# Patient Record
Sex: FEMALE | Race: WHITE
[De-identification: names, ages, dates, MRNs, and addresses within clinical notes are randomized per-mention and may not be internally consistent; named-entity substitution may affect disease eponyms.]

---

## 2019-01-05 ENCOUNTER — HOSPITAL ENCOUNTER (EMERGENCY)
Dept: HOSPITAL 25 - UCCORT | Age: 64
Discharge: HOME | End: 2019-01-05
Payer: COMMERCIAL

## 2019-01-05 VITALS — DIASTOLIC BLOOD PRESSURE: 82 MMHG | SYSTOLIC BLOOD PRESSURE: 123 MMHG

## 2019-01-05 DIAGNOSIS — Z87.891: ICD-10-CM

## 2019-01-05 DIAGNOSIS — J04.0: Primary | ICD-10-CM

## 2019-01-05 DIAGNOSIS — J45.909: ICD-10-CM

## 2019-01-05 DIAGNOSIS — Z88.8: ICD-10-CM

## 2019-01-05 DIAGNOSIS — Z88.0: ICD-10-CM

## 2019-01-05 DIAGNOSIS — Z88.2: ICD-10-CM

## 2019-01-05 PROCEDURE — 99202 OFFICE O/P NEW SF 15 MIN: CPT

## 2019-01-05 PROCEDURE — G0463 HOSPITAL OUTPT CLINIC VISIT: HCPCS

## 2019-01-05 NOTE — XMS REPORT
Continuity of Care Document (CCD)

 Created on:2018



Patient:Rica Bowie

Sex:Female

:1955

External Reference #:[id number]





Demographics







 Address  [address] [place] [address]

 

 Home Phone  [phone]

 

 Mobile Phone  [phone]

 

 Email Address  [email or web address]

 

 Preferred Language  en

 

 Marital Status  Not  or 

 

 Zoroastrianism Affiliation  Unknown

 

 Race  White

 

 Ethnic Group  Not  or 









Author







 Name  JanakDaltonna









Support







 Name  Relationship  Address  Phone

 

 Reji Bowie    [address] [phone]



     [place] [id number]  









Care Team Providers







 Name  Role  Phone

 

 Glenn Phillips  Care Team Information   Unavailable

 

 Glenn Phillips  Primary Care Physician  Unavailable









Payers







 Type  Date  Identification Numbers  Payment Provider  Subscriber

 

   Effective:  Policy Number: [id number]  BC [employer]  Rica Bowie



   2017      









 PayID: [id number]  [address] [address]









 [place] [address]







Advance Directives







 Description

 

 No Information Available







Problems







 Date  Description  Provider  Status

 

 Onset: 2018  Uncomplicated moderate persistent  Javed Felton MD
  Active



   asthma    

 

 Onset: 2018  Allergic rhinitis  Javed Felton MD  Active

 

 Onset: 2018  Allergic rhinitis due to pollen  Javed Felton MD  
Active







Family History







 Date  Family Member(s)  Problem(s)  Comments

 

   General  No Current Problems  







Social History







 Type  Date  Description  Comments

 

 Birth Sex    Unknown  

 

 Home Environment    Has a window air conditioner  

 

 Home Environment    Unfinished Basement  

 

 Home Environment    The basement is dry  

 

 Home Environment    There are draperies in the  



     home  

 

 Home Environment    The floors are carpeted  

 

 Home Environment    The floors are tile  

 

 Home Environment    The floors are wood  

 

 Home Environment    Uses natural gas heating  

 

 Smoke-Free    Home is smoke-free  

 

 Smoke-Free    Work is smoke-free  

 

 Pets    None  

 

 Tobacco Use  Start: Unknown End: Unknown  Patient is a former smoker  36 years

 

 Smoking Status  Reviewed: 18  Patient is a former smoker  36 years







Allergies, Adverse Reactions, Alerts







 Date  Description  Reaction  Status  Severity  Comments

 

 2018  Sulfa Antibiotics    Active    

 

 2018  Augmentin    Active    







Medications







 Medication  Date  Status  Form  Strength  Qnty  SIG  Indications  Ordering



                 Provider

 

 Flonase    Active  Suspension  50mcg/Act  9.900  2 puffs  J30.1  
Christopher



 Allergy  /        ml  each    SALIMA Felton MD



 Relief            nostril bid    

 

 Montelukast    Active  Tablets  10mg  30tab  take one  J30.1  Christopher



 Sodium  /        s  tablet by    SALIMA Felton MD



             mouth daily    



             in the    



             evening    

 

 Xyzal Allergy    Active  Tablets  5mg  30tab  take 1  J30.1  Christopher



 24HR  /        s  tablet (5    SALIMA Felton MD



             mg) by oral    



             route once    



             daily as    



             needed    

 

 Breo Ellipta    Active  Aerosol  200-25mcg  1unit  inhale one  J30.1  
Christopher



   /2018      /Inh  s  puff once a    SALIMA Felton MD



             day    

 

 Proair HFA    Active  Aerosol  108(90Bas  8.500  2 puffs  J30.1  
oph      e)  gm  every 4 as    SALIMA Felton MD



         mcg/Act    needed    

 

 Raloxifene    Active  Tablets  60mg    1 tab PO at    Unknown



 HCL  /0000          hs    

 

 Vitamin D    Active  Capsules  [id number]    take one    Unknown



 (Ergocalcifer  /0000          capsule by    



 ol)            mouth once    



             weekly at    



             hs    

 

 Biotin    Active  Capsules  5000mcg    1 cap PO at    Unknown



   /0000          hs    

 

 Aspirin Adult    Active  Tablets DR  81mg    1 by mouth    Unknown



 Low Dose  /0000          every day    



             at hs    

 

 Multi Vitamin  0000  Active  Tablets      1 tab PO at    Unknown



   /0000          hs    

 

 Calcium  00  Active  Tablets  600-200mg    1 tab PO    Unknown



   /0000      -Unit    daily at hs    

 

                 

 

 Montelukast    Hx  Tablets  10mg    take one    Unknown



 Sodium  /0000          tablet by    



   -          mouth daily    



             at               

 

 Ventolin HFA    Hx  Aerosol  108(90Bas    inhale 2    Unknown



   /0000      e)    puffs by    



   -      mcg/Act    inhalation    



             route every    



   /2018          4 hours as    



             needed    







Immunizations







 Description

 

 No Information Available







Vital Signs







 Date  Vital  Result  Comment

 

 2018  1:17pm  BP Systolic  128 mmHg  









 BP Diastolic  76 mmHg  

 

 Height  64 inches  5'4"

 

 Weight  161.00 lb  

 

 BMI (Body Mass Index)  27.6 kg/m2  

 

 Heart Rate  97 /min  

 

 Respiratory Rate  18 /min  

 

 Body Temperature  97.3 F  









 2018  2:01pm  BP Systolic  112 mmHg  









 BP Diastolic  74 mmHg  

 

 Height  64 inches  5'4"

 

 Weight  161.00 lb  

 

 BMI (Body Mass Index)  27.6 kg/m2  

 

 Heart Rate  68 /min  

 

 Respiratory Rate  18 /min  

 

 Body Temperature  97.7 F  

 

 O2 % BldC Oximetry  96 %  









 2018  3:25pm  BP Systolic  100 mmHg  









 BP Diastolic  72 mmHg  

 

 Height  64 inches  5'4"

 

 Weight  161.00 lb  

 

 BMI (Body Mass Index)  27.6 kg/m2  

 

 Heart Rate  74 /min  

 

 Respiratory Rate  16 /min  

 

 O2 % BldC Oximetry  99 %  







Results







 Description

 

 No Information Available







Procedures







 Date  Code  Description  Status

 

 2018  69529  Nitric Oxide  Gas Determination  Completed

 

 2018  93279  Allergy Tests Percutaneous W/ Allergenic Extracts  Completed

 

 2018  94923  Bronchodilation Responsiveness Spirometry Pre/Post  
Completed



     Bronchodil Adm  







Encounters







 Type  Date  Location  Provider  Dx  Diagnosis

 

 Office Visit  2018  2:00p  ABBIE Doyle  J30.1  Allergic 
rhinitis due to



           pollen









 J30.89  Other allergic rhinitis

 

 J45.40  Moderate persistent asthma, uncomplicated









 Office Visit  2018  3:30p  JULEE Frias30.1  
Allergic rhinitis



       MD    due to pollen









 J30.89  Other allergic rhinitis

 

 J45.40  Moderate persistent asthma, uncomplicated







Plan of Treatment

2018 - ABBIE PerdueJ30.1 Allergic rhinitis due to xulcwqV01.89 Other 
allergic vykovzveE55.40 Moderate persistent asthma, uncomplicatedComments:
Patient's PFT is within normal range and exhaled nitric oxide is 26 ppb.  
Patient tested 2+ to all 59 skin prick allergens.  Patient to use Breo for 
prophylaxis of her lungs and Proair for breakthroughchest symptoms.  Patient to 
use Flonase for prophylaxis of her nose and Xyzal for breakthrough 
nasalsymptoms.  Patient to use Singulair as prescribed.Follow up:6 months, PFT 
and NIOX prior

## 2019-01-05 NOTE — XMS REPORT
Continuity of Care Document (CCD)

 Created on:2018



Patient:Rica Bowie

Sex:Female

:1955

External Reference #:[id number]





Demographics







 Address  [address] [place] [address]

 

 Home Phone  [phone]

 

 Mobile Phone  [phone]

 

 Email Address  [email or web address]

 

 Preferred Language  en

 

 Marital Status  Not  or 

 

 Jewish Affiliation  Unknown

 

 Race  White

 

 Ethnic Group  Not  or 









Author







 Name  Javed Felton MD

 

 Address  [address]



   Unavailable



   [place] [address]









Support







 Name  Relationship  Address  Phone

 

 Reji Bowie    [address] [phone]



     [place] [address]  









Care Team Providers







 Name  Role  Phone

 

 Glenn Phillips  Care Team Information   Unavailable

 

 Glenn Phillips  Primary Care Physician  Unavailable









Payers







 Type  Date  Identification Numbers  Payment Provider  Subscriber

 

   Effective:  Policy Number: [id number]  BC VANDANA Gay  Rica LADD Azeb



   2017      









 PayID: [id number]  [address] [address]









 [place] [address]







Advance Directives







 Description

 

 No Information Available







Problems







 Date  Description  Provider  Status

 

 Onset: 2018  Uncomplicated moderate persistent  Javed Felton MD
  Active



   asthma    

 

 Onset: 2018  Allergic rhinitis  Javed Felton MD  Active

 

 Onset: 2018  Allergic rhinitis due to pollen  Javed Felton MD  
Active







Family History







 Date  Family Member(s)  Problem(s)  Comments

 

   General  No Current Problems  







Social History







 Type  Date  Description  Comments

 

 Birth Sex    Unknown  

 

 Home Environment    Has a window air conditioner  

 

 Home Environment    Unfinished Basement  

 

 Home Environment    The basement is dry  

 

 Home Environment    There are draperies in the  



     home  

 

 Home Environment    The floors are carpeted  

 

 Home Environment    The floors are tile  

 

 Home Environment    The floors are wood  

 

 Home Environment    Uses natural gas heating  

 

 Smoke-Free    Home is smoke-free  

 

 Smoke-Free    Work is smoke-free  

 

 Pets    None  

 

 Tobacco Use  Start: Unknown End: Unknown  Patient is a former smoker  36 years

 

 Smoking Status  Reviewed: 18  Patient is a former smoker  36 years







Allergies, Adverse Reactions, Alerts







 Date  Description  Reaction  Status  Severity  Comments

 

 2018  Sulfa Antibiotics    Active    

 

 2018  Augmentin    Active    







Medications







 Medication  Date  Status  Form  Strength  Qnty  SIG  Indications  Ordering



                 Provider

 

 Flonase    Active  Suspension  50mcg/Act  9.900  2 puffs  J30.1  
Christopher



 Allergy  /        ml  each    SALIMA Felton MD



 Relief            nostril bid    

 

 Montelukast    Active  Tablets  10mg  30tab  take one  J30.1  Christopher



 Sodium  /        s  tablet by    SALIMA Felton MD



             mouth daily    



             in the    



             evening    

 

 Xyzal Allergy    Active  Tablets  5mg  30tab  take 1  J30.1  Christopher



 24HR  /        s  tablet (5    SALIMA Felton MD



             mg) by oral    



             route once    



             daily as    



             needed    

 

 Breo Ellipta    Active  Aerosol  200-25mcg  1unit  inhale one  J30.1  
Christopher



   /2018      /Inh  s  puff once a    SALIMA Felton MD



             day    

 

 Proair HFA    Active  Aerosol  108(90Bas  8.500  2 puffs  J30.1  [place]ace      e)  gm  every 4    Raudel, RPA-C



         mcg/Act    hours as    



             needed    

 

 Raloxifene    Active  Tablets  60mg    1 tab PO at    Unknown



 HCL  /0000          hs    

 

 Vitamin D  00  Active  Capsules  91765Agqq    take one    Unknown



 (Ergocalcifer  /0000          capsule by    



 ol)            mouth once    



             weekly at    



             hs    

 

 Biotin  00  Active  Capsules  5000mcg    1 cap PO at    Unknown



   /0000          hs    

 

 Aspirin Adult    Active  Tablets DR  81mg    1 by mouth    Unknown



 Low Dose  /0000          every day    



             at hs    

 

 Multi Vitamin  00/00  Active  Tablets      1 tab PO at    Unknown



   /0000          hs    

 

 Calcium  0000  Active  Tablets  600-200mg    1 tab PO    Unknown



   /0000      -Unit    daily at hs    

 

                 

 

 Montelukast    Hx  Tablets  10mg    take one    Unknown



 Sodium  /0000          tablet by    



   -          mouth daily    



             at               

 

 Ventolin HFA    Hx  Aerosol  108(90Bas    inhale 2    Unknown



   /0000      e)    puffs by    



   -      mcg/Act    inhalation    



             route every    



   /2018          4 hours as    



             needed    







Immunizations







 Description

 

 No Information Available







Vital Signs







 Date  Vital  Result  Comment

 

 2018  1:17pm  BP Systolic  128 mmHg  









 BP Diastolic  76 mmHg  

 

 Height  64 inches  5'4"

 

 Weight  161.00 lb  

 

 BMI (Body Mass Index)  27.6 kg/m2  

 

 Heart Rate  97 /min  

 

 Respiratory Rate  18 /min  

 

 Body Temperature  97.3 F  









 2018  2:01pm  BP Systolic  112 mmHg  









 BP Diastolic  74 mmHg  

 

 Height  64 inches  5'4"

 

 Weight  161.00 lb  

 

 BMI (Body Mass Index)  27.6 kg/m2  

 

 Heart Rate  68 /min  

 

 Respiratory Rate  18 /min  

 

 Body Temperature  97.7 F  

 

 O2 % BldC Oximetry  96 %  









 2018  3:25pm  BP Systolic  100 mmHg  









 BP Diastolic  72 mmHg  

 

 Height  64 inches  5'4"

 

 Weight  161.00 lb  

 

 BMI (Body Mass Index)  27.6 kg/m2  

 

 Heart Rate  74 /min  

 

 Respiratory Rate  16 /min  

 

 O2 % BldC Oximetry  99 %  







Results







 Description

 

 No Information Available







Procedures







 Date  Code  Description  Status

 

 2018  77443  Nitric Oxide  Gas Determination  Completed

 

 2018  38838  Bronchodilation Responsiveness Spirometry Pre/Post  
Completed



     Bronchodil Adm  

 

 2018  66833  Nitric Oxide  Gas Determination  Completed

 

 2018  43360  Allergy Tests Percutaneous W/ Allergenic Extracts  Completed

 

 2018  03603  Bronchodilation Responsiveness Spirometry Pre/Post  
Completed



     Bronchodil Adm  







Encounters







 Type  Date  Location  Provider  Dx  Diagnosis

 

 Office Visit  2018  [place]  Raudel Gonsalez RPA-C  J30.1  Allergic 
rhinitis due



   1:00p        to pollen









 J30.89  Other allergic rhinitis

 

 J45.40  Moderate persistent asthma, uncomplicated









 Office Visit  2018  2:00p  ABBIE Doyle  J30.1  Allergic 
rhinitis due to



           pollen









 J30.89  Other allergic rhinitis

 

 J45.40  Moderate persistent asthma, uncomplicated









 Office Visit  2018  3:30p  JULEE Frias30.1  
Allergic rhinitis



       MD    due to pollen









 J30.89  Other allergic rhinitis

 

 J45.40  Moderate persistent asthma, uncomplicated







Plan of Treatment

Future Appointment(s):2019  8:30 am - RANDY Elias at 
Tnk[facility]owwl33/20/2018 - Raudel Gonsalez RPA-CJ30.1 Allergic rhinitis due to 
pollenComments:Continue all prescribed medications as directed. Pulmonary 
Function Test and Fractionated ExpiratoryNitrous Oxide test next 
appointment.Follow up:6 months, sooner as jsrnohV38.89 Other allergic 
admdmjxaB45.40 Moderate persistent asthma, uncomplicated

## 2019-01-05 NOTE — UC
General HPI





- HPI Summary


HPI Summary: 





ILL X 3 WEEKS.


BEGAN AS SINUS CONGESTION.


NOW C/O LARYNGITIS, COUGH WITH CONGESTION AND WHEEZING PLUS SOME SOB.


HX ASTHMA.





- History of Current Complaint


Chief Complaint: UCRespiratory


Stated Complaint: COUGH,ST,TROUBLE BREATHING,HX OF ASTHMA


Time Seen by Provider: 01/05/19 12:40


Hx Obtained From: Patient


Onset/Duration: Gradual Onset


Timing: Constant


Pain Intensity: 0


Associated Signs & Symptoms: Negative: Fever





- Allergy/Home Medications


Allergies/Adverse Reactions: 


 Allergies











Allergy/AdvReac Type Severity Reaction Status Date / Time


 


amoxicillin [From Augmentin] Allergy Unknown thrush, Verified 01/05/19 12:38





   yeast  





   infection  


 


clavulanic acid Allergy Unknown thrush, Verified 01/05/19 12:38





[From Augmentin]   yeast  





   infection  


 


Sulfa (Sulfonamide Allergy Unknown Unknown Verified 01/05/19 12:37





Antibiotics)   Reaction  





   Details  


 


enviromental Allergy Unknown Unknown Uncoded 01/05/19 12:37





   Reaction  





   Details  











Home Medications: 


 Home Medications





Albuterol HFA INHALER* [Ventolin HFA Inhaler*] 2 puff INH Q4H PRN 01/05/19 [

History Confirmed 01/05/19]


Aspirin 81 mg CHEW TAB* [Aspirin Low Dose TAB*] 81 mg PO DAILY 01/05/19 [

History Confirmed 01/05/19]


Biotin [Ultra Biotin] 5,000 mg PO DAILY 01/05/19 [History Confirmed 01/05/19]


Brio Ellipta 1 inh INH DAILY 01/05/19 [History]


Calcium Carbonate/Vitamin D3 [Calcium 600 + Vit D Tablet] 1 each PO DAILY 01/05/ 19 [History Confirmed 01/05/19]


Cholecalciferol TAB* [Vitamin D TAB*] 50,000 unit PO DAILY 01/05/19 [History 

Confirmed 01/05/19]


Fluticasone NASAL SPRAY 50MCG* [Flonase NASAL SPRAY 50MCG*] 2 spray BOTH NARES 

DAILY 01/05/19 [History Confirmed 01/05/19]


Levocetirizine Dihydrochloride [Xyzal Allergy 24Hr] 5 mg PO PRN 01/05/19 [

History]


Montelukast Sodium TAB* [Singulair TAB*] 10 mg PO DAILY 01/05/19 [History 

Confirmed 01/05/19]


Multivitamin [Multivitamins] 1 cap PO DAILY 01/05/19 [History Confirmed 01/05/19

]


Phenylephrine/Dm/Acetaminop/GG [Vicks Dayquil Severe Cold] 1 liq PO PRN 01/05/ 19 [History]


Raloxifene HCl [Evista] 60 mg PO DAILY 01/05/19 [History Confirmed 01/05/19]











PMH/Surg Hx/FS Hx/Imm Hx





- Additional Past Medical History


Additional PMH: 





ALLERGIES


Respiratory History: Asthma





- Surgical History


Surgical History: Yes


Surgery Procedure, Year, and Place: TONSILLECTOMY.  ANAL FISSURE REPAIR





- Social History


Alcohol Use: Occasionally


Substance Use Type: None


Smoking Status (MU): Former Smoker


When Did the Patient Quit Smoking/Using Tobacco: 40 YEARS AGO





- Immunization History


Vaccination Up to Date: Yes





Review of Systems


All Other Systems Reviewed And Are Negative: Yes


Constitutional: Positive: Negative


Skin: Positive: Negative


Eyes: Positive: Negative


ENT: Positive: Negative


Respiratory: Positive: Shortness Of Breath, Cough


Cardiovascular: Positive: Negative


Gastrointestinal: Positive: Negative


Genitourinary: Positive: Negative


Motor: Positive: Negative


Neurovascular: Positive: Negative


Musculoskeletal: Positive: Negative


Neurological: Positive: Negative


Psychological: Positive: Negative





Physical Exam


Triage Information Reviewed: Yes


Appearance: Well-Appearing


Vital Signs: 


 Initial Vital Signs











Temp  97.3 F   01/05/19 12:46


 


Pulse  93   01/05/19 12:46


 


Resp  15   01/05/19 12:46


 


BP  123/82   01/05/19 12:46


 


Pulse Ox  98   01/05/19 12:46











Vital Signs Reviewed: Yes


Eyes: Positive: Conjunctiva Clear


ENT: Positive: Pharynx normal, TMs normal, Hoarse voice, Uvula midline.  

Negative: Nasal congestion, Nasal drainage, Trismus, Muffled voice


Neck: Positive: Supple, Nontender, No Lymphadenopathy


Respiratory: Positive: No respiratory distress, Decreased breath sounds, Other: 

- Harsh cough.


Cardiovascular: Positive: RRR, No Murmur


Abdomen Description: Positive: Nontender, No Organomegaly, Soft


Bowel Sounds: Positive: Present


Musculoskeletal: Positive: ROM Intact


Neurological: Positive: Alert


Psychological: Positive: Age Appropriate Behavior


Skin Exam: Normal





Course/Dx





- Course


Course Of Treatment: t request diflucan, hx vaginbist(yeast) with antibiotic 

use.  will tx for asthma flare and presumptive bacterial infection.





- Diagnoses


Provider Diagnosis: 


 Laryngitis, Asthma








Discharge





- Sign-Out/Discharge


Documenting (check all that apply): Patient Departure


All imaging exams completed and their final reports reviewed: No Studies





- Discharge Plan


Condition: Stable


Disposition: HOME


Prescriptions: 


DOXYcycline CAP(*) [DOXYcycline 100MG CAP(*)] 100 mg PO BID 7 Days #14 cap


Fluconazole 150 MG TAB* [Diflucan 150 MG TAB*] 150 mg PO ED ONCE #1 tablet


predniSONE TAB* [Deltasone 20 MG TAB*] 40 mg PO DAILY 5 Days #10 tab


Patient Education Materials:  Asthma (DC), Laryngitis (ED)


Referrals: 


Glenn Phillips MD [Primary Care Provider] - 7 Days


Additional Instructions: 


USE ALBUTEROL INHALER 2 PUFFS EVERY 6 HOURS





- Billing Disposition and Condition


Condition: STABLE


Disposition: Home





- Attestation Statements


Provider Attestation: 





I was available for consult. This patient was seen by the UBALDO. The patient was 

not presented to, seen by, or examined by me. EK